# Patient Record
Sex: FEMALE | Race: OTHER | Employment: OTHER | URBAN - METROPOLITAN AREA
[De-identification: names, ages, dates, MRNs, and addresses within clinical notes are randomized per-mention and may not be internally consistent; named-entity substitution may affect disease eponyms.]

---

## 2022-01-20 NOTE — PATIENT DISCUSSION
Recommend lab work with PCP, to be done in February (Dr. Eagle Dodson at [facility]) [facility] address. RTC 1 month for repeat field, pt reports no other symptoms or vision changes.

## 2022-01-20 NOTE — PATIENT DISCUSSION
Arcuate/ Altitudinal superior defect OD. Pt reports no jaw caludication, temporal tenderness, or TIA. No history of sleep apnea.

## 2022-02-18 NOTE — PATIENT DISCUSSION
Recommend lab work with PCP, to be done in February (Dr. Alida Meadows at [facility]) [place] address. RTC 1 month for repeat field, pt reports no other symptoms or vision changes.

## 2022-02-18 NOTE — PATIENT DISCUSSION
Arcuate/ Altitudinal superior defect OD improved on today's VF. OS new nasal depression, scattered and peripheral. Overall improved field, monitor for changes.

## 2022-02-18 NOTE — PATIENT DISCUSSION
1/20/22: Has previously stated On R gaze pt reports intermittent diplopia (equal superior/inferior) 8pd BI OS helped some, but pt was very inconsistent with responses on presence of diplopia or not. No diplopia noted on today's examination.

## 2022-06-19 RX ORDER — ATORVASTATIN CALCIUM 80 MG/1
TABLET, FILM COATED ORAL
COMMUNITY

## 2022-06-19 RX ORDER — CHLORTHALIDONE 25 MG/1
TABLET ORAL
COMMUNITY

## 2022-06-19 RX ORDER — DICYCLOMINE HYDROCHLORIDE 10 MG/1
1 CAPSULE ORAL
COMMUNITY

## 2022-09-14 PROBLEM — K86.2 CYST OF PANCREAS: Status: ACTIVE | Noted: 2022-09-14

## 2022-09-14 PROBLEM — K21.9 GASTROESOPHAGEAL REFLUX DISEASE: Status: ACTIVE | Noted: 2022-09-14

## 2022-09-14 PROBLEM — N39.0 URINARY TRACT INFECTION: Status: ACTIVE | Noted: 2022-09-14

## 2022-09-14 PROBLEM — E78.5 HYPERLIPIDEMIA: Status: ACTIVE | Noted: 2022-09-14

## 2022-09-14 PROBLEM — G56.00 CARPAL TUNNEL SYNDROME: Status: ACTIVE | Noted: 2022-09-14

## 2022-09-14 PROBLEM — R25.2 SPASM: Status: ACTIVE | Noted: 2022-09-14

## 2022-09-14 PROBLEM — E87.6 HYPOKALEMIA: Status: ACTIVE | Noted: 2022-09-14

## 2022-09-14 PROBLEM — K81.9 CHOLECYSTITIS: Status: ACTIVE | Noted: 2022-09-14

## 2022-09-14 PROBLEM — I48.0 PAROXYSMAL ATRIAL FIBRILLATION (HCC): Status: ACTIVE | Noted: 2022-09-14

## 2022-09-14 PROBLEM — K55.9 ISCHEMIC BOWEL DISEASE (HCC): Status: ACTIVE | Noted: 2022-09-14

## 2022-09-14 PROBLEM — I10 HYPERTENSION: Status: ACTIVE | Noted: 2022-09-14

## 2022-09-14 PROBLEM — J45.909 ASTHMA: Status: ACTIVE | Noted: 2022-09-14

## 2022-09-14 PROBLEM — I83.899 VARICOSE VEINS OF UNSPECIFIED LOWER EXTREMITY WITH OTHER COMPLICATIONS: Status: ACTIVE | Noted: 2022-09-14

## 2022-09-14 PROBLEM — M17.9 OSTEOARTHRITIS OF KNEE: Status: ACTIVE | Noted: 2022-09-14

## 2022-09-14 PROBLEM — I51.89 DIASTOLIC DYSFUNCTION: Status: ACTIVE | Noted: 2022-09-14

## 2022-09-14 PROBLEM — R19.4 ALTERED BOWEL HABITS: Status: ACTIVE | Noted: 2022-09-14

## 2022-09-14 PROBLEM — J30.9 ALLERGIC RHINITIS: Status: ACTIVE | Noted: 2022-09-14

## 2022-09-14 PROBLEM — R26.9 ABNORMAL GAIT: Status: ACTIVE | Noted: 2022-09-14

## 2022-09-14 PROBLEM — K86.1 CHRONIC PANCREATITIS (HCC): Status: ACTIVE | Noted: 2022-09-14

## 2022-09-15 PROBLEM — E78.5 HYPERLIPIDEMIA: Status: ACTIVE | Noted: 2017-07-26

## 2022-09-15 PROBLEM — E87.6 HYPOKALEMIA: Status: ACTIVE | Noted: 2017-07-26

## 2022-10-14 PROBLEM — N39.0 URINARY TRACT INFECTION: Status: RESOLVED | Noted: 2022-09-14 | Resolved: 2022-10-14

## 2022-11-04 PROBLEM — N18.31 STAGE 3A CHRONIC KIDNEY DISEASE (HCC): Status: ACTIVE | Noted: 2022-11-04

## 2022-11-04 PROBLEM — K22.719 BARRETT'S ESOPHAGUS WITH DYSPLASIA: Status: ACTIVE | Noted: 2022-11-04

## 2022-11-04 PROBLEM — E11.9 TYPE 2 DIABETES MELLITUS WITHOUT COMPLICATION, WITHOUT LONG-TERM CURRENT USE OF INSULIN (HCC): Status: ACTIVE | Noted: 2022-11-04

## 2022-12-22 NOTE — PATIENT DISCUSSION
Recommend lab work with PCP, to be done in February (Dr. Cornelius Martini at [facility]) [place] address. RTC 1 month for repeat field, pt reports no other symptoms or vision changes.

## 2023-04-27 ENCOUNTER — ESTABLISHED PATIENT (OUTPATIENT)
Dept: URBAN - METROPOLITAN AREA CLINIC 15 | Facility: CLINIC | Age: 83
End: 2023-04-27

## 2023-04-27 DIAGNOSIS — H04.123: ICD-10-CM

## 2023-04-27 DIAGNOSIS — H35.371: ICD-10-CM

## 2023-04-27 PROCEDURE — 92015 DETERMINE REFRACTIVE STATE: CPT

## 2023-04-27 PROCEDURE — 92014 COMPRE OPH EXAM EST PT 1/>: CPT

## 2023-04-27 ASSESSMENT — VISUAL ACUITY
OD_SC: 20/40+2
OS_SC: 20/60
OU_SC: 20/40-2
OS_PH: 20/40+1

## 2023-04-27 ASSESSMENT — TONOMETRY
OD_IOP_MMHG: 9
OS_IOP_MMHG: 8